# Patient Record
(demographics unavailable — no encounter records)

---

## 2024-10-14 NOTE — PHYSICAL EXAM
[General Appearance - Alert] : alert [General Appearance - In No Acute Distress] : in no acute distress [General Appearance - Well Nourished] : well nourished [General Appearance - Well Developed] : well developed [General Appearance - Well-Appearing] : healthy appearing [] : normal voice and communication [Oriented To Time, Place, And Person] : oriented to person, place, and time [Impaired Insight] : insight and judgment were intact [Mood] : the mood was normal [Affect] : the affect was normal [Memory Recent] : recent memory was not impaired [Memory Remote] : remote memory was not impaired

## 2024-10-14 NOTE — HISTORY OF PRESENT ILLNESS
[FreeTextEntry1] : 57-year-old right-handed woman history of chronic migraines, here for follow-up visit, scheduled both appointment.  Continues with good response to Botox therapy, has 2 months of usually pretty good headache control, but then has a skip 2 headaches right before the next treatment.  She continues on topiramate 200 mg daily, well-tolerated. If needed uses sumatriptan 100 mg, sometimes had to take a second dose 2 hours later, patient with good response, well-tolerated. Recently dealing with the significant father, who lives in [place], [place], family has been alternating siblings helping him.

## 2024-10-14 NOTE — ASSESSMENT
[FreeTextEntry1] : 57-year-old right handed woman history of chronic migraines, stable doing well on Botox therapy.  Also wants topiramate 200 mg daily. Reviewed and discussed treatment, preventative therapy.  No change at this time. Sumatriptan 100 mg as needed for headache, continue be very effective in relieving her pain, will continue same medication. Return to office, 3 months.

## 2025-01-21 NOTE — ASSESSMENT
[FreeTextEntry1] : 57-year-old right-handed with a history of intractable chronic migraines, tolerated Botox well.  Still having significant number of headaches, especially the last 2 weeks before her next treatment. Reviewed and discussed option. Plan: Will continue with propranolol, topiramate. Can use Nurtec 75 mg p.o. daily as needed headache, suggested that she schedule her treatment with the Nurtec every other day as a preventative for her before she comes back. Advised to maintain headache diary. Return to office, 3 months.

## 2025-01-21 NOTE — HISTORY OF PRESENT ILLNESS
[FreeTextEntry1] : 57-year-old right-handed woman with a history of chronic migraines, here for follow-up visit, and scheduled Botox appointment.  Last time seen in this office For Botox therapy and follow-up was in October 2024.  Since then she reports continued having fairly good response to Botox therapy, 200 units every 3 months and most of the head and neck.  Still using topiramate still using propranolol. Headaches about once a week using sumatriptan 100 mg works quite well, but she has noticed for the last 2 weeks before the next Botox, the headaches do come back and sometimes almost every day.  No episodes of loss of vision, trouble speaking, unilateral weakness or numbness of face or extremities.

## 2025-02-04 NOTE — DISCUSSION/SUMMARY
[FreeTextEntry1] : 1) pap performed 2)refill for Clobetasol and vaginal estrogen issued 3) rx for mammo/breast sono and DEXA issued. pt has mammo/sono apt today. advised to f/u for DEXA.   return to office for vulvar biopsy for definitive diagnosis.
minimum assist (75% patients effort)

## 2025-02-04 NOTE — PHYSICAL EXAM
[Appropriately responsive] : appropriately responsive [Alert] : alert [No Acute Distress] : no acute distress [No Lymphadenopathy] : no lymphadenopathy [Oriented x3] : oriented x3 [Examination Of The Breasts] : a normal appearance [No Discharge] : no discharge [No Masses] : no breast masses were palpable [Labia Majora] : normal [Labia Minora] : normal [Normal] : normal [Uterine Adnexae] : normal [FreeTextEntry1] : area of hypopigmentation noted to left side of perineum.

## 2025-03-11 NOTE — PROCEDURE
[Vulvar Biopsy] : Vulvar Biopsy [Time out performed] : Pre-procedure time out performed.  Patient's name, date of birth and procedure confirmed. [Consent Obtained] : Consent obtained [] : in the Perineum [Size of Biopsy Taken: ___ (mm)] : [unfilled]Umm [Local Anesthesia] : local anesthesia [____ Lidocaine w/o Epi] : ~VmL lidocaine without epinephrine [Sent to Pathology] : placed in buffered formalin and sent for pathology [Punch] : punch biopsy [Pressure] : the application of direct pressure [Tolerated Well] : the patient tolerated the procedure well [No Complications] : there were no complications

## 2025-03-27 NOTE — DATA REVIEWED
[de-identified] : MRI brain with and without contrast 6/16/21:\par  \par  1)  Unremarkable pre and postcontrast MRI study of the brain. No focal areas of microhemorrhage. No ischemic changes or demyelinating foci noted..\par  2)  mild left mastoid partial opacification. Sinuses are clear..\par   [de-identified] : MRI cervical spine 6/16/21:\par  Abnormal subtle marrow edema in the right-sided C3 and C4 posterior elements with adjacent trace perifacetal soft tissue edema, a nonspecific finding, may be degenerative/inflammatory versus posttraumatic. Please correlate with CT cervical spine.\par  \par  Multilevel degenerative changes, most pronounced at C4-C5 through C6-C7, as described above.\par

## 2025-03-27 NOTE — HISTORY OF PRESENT ILLNESS
[FreeTextEntry1] : Initial Visit 7/1/21: Ms. Espinoza was admitted to [facility] on 6/14/21 with two days of worsening vertigo.  She states that at this time her vertigo is not her main concern.  About one week ago she began having pain in her right shoulder that shoots up her neck and down the arm. She felt as if she had a knot in the area. Three days ago the pain became severe. She has difficulty finding a comfortable position.  She has tingling down the right arm and into her hand. The pain shoots She has been taking anti-inflammatories at home.  She had an MRI of the cervical spine performed on 6/16/21 while in the hospital. She did not have any neck complaints at that time. It was performed to look for any issue related to a prior accident that she had.  She sees a chiropractor 3-4 times per week for management of migraines. She did see her chiropractor in between the MRI for neck adjustments. She has not had any adjustments since the pain started.   About 15 years ago she had a similar problem on the left side and had good results with injections.  Her vertigo has mostly resolved but is still present if she turns quickly or makes any quick movements of her hand. Her chiropractor has been helping her with vestibular exercises. She did not find meclizine to be helpful in the hospital.  She was prescribed sumatriptan  50 mg for migraines. She tried it once or twice but did not find it to be effective.  Interval History: 11/18/21 Vertigo has resolved. her neck pain has improved. She recently had an increased frequency in migraines.  She has had migraines since her 20s. They got worse after menopause. Over the last two months she has had about five migraines per week. She does not have had an aura. Her migraines are associated with photophobia, phonophobia, nausea and vomiting. She is not aware of any particular triggers.  She has eletriptan 20 mg from her daughter. It only works if she takes 60 mg. She also has sumatriptan 50 mg, rizatriptan 10 mg and Nurtec 75 mg from her niece. She forgets which have helped.  2/2/22: She has noted some improvement with Topiramate. She has increased the dose to 50 mg qhs but still has 1-2 migraines per week. She is not aware of any side effects.  She tried rizatriptan 10 mg and took 8 pills in two days. She has a letter from her pharmacy saying that sumatriptan is not covered after 2/15.  Neck pain is improved overall. She does get pain when she has a migraine.  Vertigo is improved.  6/2/22: She has increased topiramate to 100 mg/day. She had some side effects which dissipated. She notes an improvement in her headache frequency. She has sporadic migraines. She can go a month without a migraine but they will then occur in clusters. Sumatriptan 100 mg is effective in aborting her headaches. However, her insurance will not cover sumatriptan. She denies having any aura.  7/14/22: She states that she was working from and noticed a dent on the right side of her head, towards the top. She denies recent trauma. The spot is not tender. She has been using a hair crown for the last two weeks. She states that for the last two weeks she has had a daily headaches. She states this is different from her migraines. The headache is holocephalic. She rates this as a 5/10. She has some associated nausea. She does not have vision change, photophobia, phonophobia.  She continues to use Topiramate 100 mg. She took sumatriptan the last few days and then Excedrin. She had some relief last night with a cold compress. She denies sinus congestion.  1/19/23: She reports having 1-2 migraines per week. She does think that topiramate has helped overall but she is still having migraines.  She is not having side effects at this time.  Sumatriptan is usually helpful to abort a migraine.  She thinks that she may have used gabapentin in the past.  She is not aware of anything in her diet which may be contributing to migraines. She is sleeping relatively well. She can drink more water.   10/2/23: She continues to take Topiramate, a total of 175 mg/day (she increased this from 150 mg/day a few days ago). She does have some cognitive side effects but feels that the benefits outweigh the side effect. She saw Dr. Watt for botox, but so far this has been denied by insurance. She tried propranolol, nortriptyline, verapamil. She is taking amitriptyline 10 mg. She continues to have migraines approximately two days per week. Sumatriptan continues to be effective to abort migraines. She has not had any recent vertigo. She had one injection of Aimovig in the office. She did notice any improvement and is not interested in self injecting.  5/23/24: She reports having headaches every other day. She has associated nausea but can still function with these headaches. Migraines occur about once a week.  She is taking Topiramate 200 mg hs. She does think that it causes some cognitive symptoms. She had one session of botox and is scheduled for another session on 7/2. sumatriptan 100 mg is effective for breakthrough headaches.   She recently noted in a picture that the right side of her face is droopier than the left.   3/27/25: She has been receiving Botox. She is taking Topiramate 200 mg hs. She notes some cognitive side effects. Paresthesias resolved. She reports an average of 1 migraines per week. Weather changes tend to bring them up. She takes sumatriptan 100 mg and sometimes repeats the dose x 1 if needed. She has Nurtec at home but has not tried it. She has not had any recent vertigo, but she avoids movements that may trigger vertigo.

## 2025-03-27 NOTE — DISCUSSION/SUMMARY
[FreeTextEntry1] :     Ms. Espinoza is a 57 year old woman previously admitted with vertigo, later seen with right sided neck pain and now presenting with migraines.  # Migraines -Chronic migraines without aura. -Topiramate is now at 200 mg hs. She notes some cognitive side effects but feel that benefits outweigh side effects and is not interested in reducing the dose at this time. -Continue Topiramate 200 mg hs -Continue Botox -Continue sumatriptan 100 mg prn breakthrough migraine. Do not exceed 200 mg in a 24 hour period -Can also try Nurtec and see if this is more effective than sumatriptan. Advised not to take more than one 75 mg tablet in 24 hours. -Keep a headache log, look for triggers/patterns.    Vertigo -Resolved -MRI brain in hospital was unremarkable other than mild left mastoid partial opacification   f/u 6-9 months, sooner if needed.

## 2025-03-27 NOTE — DATA REVIEWED
[de-identified] : MRI brain with and without contrast 6/16/21:\par  \par  1)  Unremarkable pre and postcontrast MRI study of the brain. No focal areas of microhemorrhage. No ischemic changes or demyelinating foci noted..\par  2)  mild left mastoid partial opacification. Sinuses are clear..\par   [de-identified] : MRI cervical spine 6/16/21:\par  Abnormal subtle marrow edema in the right-sided C3 and C4 posterior elements with adjacent trace perifacetal soft tissue edema, a nonspecific finding, may be degenerative/inflammatory versus posttraumatic. Please correlate with CT cervical spine.\par  \par  Multilevel degenerative changes, most pronounced at C4-C5 through C6-C7, as described above.\par

## 2025-03-27 NOTE — HISTORY OF PRESENT ILLNESS
[FreeTextEntry1] : Initial Visit 7/1/21: Ms. Espinoza was admitted to [facility] on 6/14/21 with two days of worsening vertigo.  She states that at this time her vertigo is not her main concern.  About one week ago she began having pain in her right shoulder that shoots up her neck and down the arm. She felt as if she had a knot in the area. Three days ago the pain became severe. She has difficulty finding a comfortable position.  She has tingling down the right arm and into her hand. The pain shoots She has been taking anti-inflammatories at home.  She had an MRI of the cervical spine performed on 6/16/21 while in the hospital. She did not have any neck complaints at that time. It was performed to look for any issue related to a prior accident that she had.  She sees a chiropractor 3-4 times per week for management of migraines. She did see her chiropractor in between the MRI for neck adjustments. She has not had any adjustments since the pain started.   About 15 years ago she had a similar problem on the left side and had good results with injections.  Her vertigo has mostly resolved but is still present if she turns quickly or makes any quick movements of her hand. Her chiropractor has been helping her with vestibular exercises. She did not find meclizine to be helpful in the hospital.  She was prescribed sumatriptan  50 mg for migraines. She tried it once or twice but did not find it to be effective.  Interval History: 11/18/21 Vertigo has resolved. her neck pain has improved. She recently had an increased frequency in migraines.  She has had migraines since her 20s. They got worse after menopause. Over the last two months she has had about five migraines per week. She does not have had an aura. Her migraines are associated with photophobia, phonophobia, nausea and vomiting. She is not aware of any particular triggers.  She has eletriptan 20 mg from her daughter. It only works if she takes 60 mg. She also has sumatriptan 50 mg, rizatriptan 10 mg and Nurtec 75 mg from her niece. She forgets which have helped.  2/2/22: She has noted some improvement with Topiramate. She has increased the dose to 50 mg qhs but still has 1-2 migraines per week. She is not aware of any side effects.  She tried rizatriptan 10 mg and took 8 pills in two days. She has a letter from her pharmacy saying that sumatriptan is not covered after 2/15.  Neck pain is improved overall. She does get pain when she has a migraine.  Vertigo is improved.  6/2/22: She has increased topiramate to 100 mg/day. She had some side effects which dissipated. She notes an improvement in her headache frequency. She has sporadic migraines. She can go a month without a migraine but they will then occur in clusters. Sumatriptan 100 mg is effective in aborting her headaches. However, her insurance will not cover sumatriptan. She denies having any aura.  7/14/22: She states that she was working from and noticed a dent on the right side of her head, towards the top. She denies recent trauma. The spot is not tender. She has been using a hair crown for the last two weeks. She states that for the last two weeks she has had a daily headaches. She states this is different from her migraines. The headache is holocephalic. She rates this as a 5/10. She has some associated nausea. She does not have vision change, photophobia, phonophobia.  She continues to use Topiramate 100 mg. She took sumatriptan the last few days and then Excedrin. She had some relief last night with a cold compress. She denies sinus congestion.  1/19/23: She reports having 1-2 migraines per week. She does think that topiramate has helped overall but she is still having migraines.  She is not having side effects at this time.  Sumatriptan is usually helpful to abort a migraine.  She thinks that she may have used gabapentin in the past.  She is not aware of anything in her diet which may be contributing to migraines. She is sleeping relatively well. She can drink more water.   10/2/23: She continues to take Topiramate, a total of 175 mg/day (she increased this from 150 mg/day a few days ago). She does have some cognitive side effects but feels that the benefits outweigh the side effect. She saw Dr. Watt for botox, but so far this has been denied by insurance. She tried propranolol, nortriptyline, verapamil. She is taking amitriptyline 10 mg. She continues to have migraines approximately two days per week. Sumatriptan continues to be effective to abort migraines. She has not had any recent vertigo. She had one injection of Aimovig in the office. She did notice any improvement and is not interested in self injecting.  5/23/24: She reports having headaches every other day. She has associated nausea but can still function with these headaches. Migraines occur about once a week.  She is taking Topiramate 200 mg hs. She does think that it causes some cognitive symptoms. She had one session of botox and is scheduled for another session on 7/2. sumatriptan 100 mg is effective for breakthrough headaches.   She recently noted in a picture that the right side of her face is droopier than the left.   3/27/25: She has been receiving Botox. She is taking Topiramate 200 mg hs. She notes some cognitive side effects. Paresthesias resolved. She reports an average of 1 migraines per week. Weather changes tend to bring them up. She takes sumatriptan 100 mg and sometimes repeats the dose x 1 if needed. She has Nurtec at home but has not tried it. She has not had any recent vertigo, but she avoids movements that may trigger vertigo.

## 2025-03-27 NOTE — DISCUSSION/SUMMARY
[FreeTextEntry1] :     Ms. Espinoza is a 57 year old woman previously admitted with vertigo, later seen with right sided neck pain and now presenting with migraines.  # Migraines -Chronic migraines without aura. -Topiramate is now at 200 mg hs. She notes some cognitive side effects but feel that benefits outweigh side effects and is not interested in reducing the dose at this time. -Continue Topiramate 200 mg hs -Continue Botox -Continue sumatriptan 100 mg prn breakthrough migraine. Do not exceed 200 mg in a 24 hour period -Can also try Nurtec and see if this is more effective than sumatriptan. Advised not to take more than one 75 mg tablet in 24 hours. -Keep a headache log, look for triggers/patterns.    Vertigo -Resolved -MRI brain in hospital was unremarkable other than mild left mastoid partial opacification   f/u 6-9 months, sooner if needed.    Minocycline Counseling: Patient advised regarding possible photosensitivity and discoloration of the teeth, skin, lips, tongue and gums.  Patient instructed to avoid sunlight, if possible.  When exposed to sunlight, patients should wear protective clothing, sunglasses, and sunscreen.  The patient was instructed to call the office immediately if the following severe adverse effects occur:  hearing changes, easy bruising/bleeding, severe headache, or vision changes.  The patient verbalized understanding of the proper use and possible adverse effects of minocycline.  All of the patient's questions and concerns were addressed.

## 2025-07-22 NOTE — ASSESSMENT
[FreeTextEntry1] : 57-year-old woman with a history of chronic migraines, has had fairly good response to combination of topiramate 200 mg once a day as well as Botox therapy 200 units every 3 months injected muscle of the head and neck. Reviewed and discussed treatment, no change at this time. Return to office, 3 months.

## 2025-07-22 NOTE — HISTORY OF PRESENT ILLNESS
[FreeTextEntry1] : 57-year-old woman right-handed history of chronic migraines, here for follow-up visit, scheduled Botox appointment.  Reports that headaches are about the same once or twice a week mild severe headache, which treatment usually headaches resolve within 4 hours or so.  Continues on topiramate and uses sumatriptan 100 mg as needed for headache.  Has Zofran 4 mg as needed nausea. The combination of both, as well as a topiramate has had a significant improvement in frequency of headaches.